# Patient Record
Sex: MALE | Race: WHITE
[De-identification: names, ages, dates, MRNs, and addresses within clinical notes are randomized per-mention and may not be internally consistent; named-entity substitution may affect disease eponyms.]

---

## 2019-04-18 ENCOUNTER — HOSPITAL ENCOUNTER (EMERGENCY)
Dept: HOSPITAL 80 - FED | Age: 49
Discharge: HOME | End: 2019-04-18
Payer: COMMERCIAL

## 2019-04-18 VITALS — DIASTOLIC BLOOD PRESSURE: 75 MMHG | SYSTOLIC BLOOD PRESSURE: 142 MMHG

## 2019-04-18 DIAGNOSIS — S05.92XA: ICD-10-CM
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
## 2019-04-18 DIAGNOSIS — S05.91XA: Primary | ICD-10-CM
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
## 2019-04-18 NOTE — EDPHY
H & P


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


Time Seen by Provider: 04/18/19 03:20


HPI/ROS: 





HPI





CHIEF COMPLAINT:  Welding, bilateral eye pain





HISTORY OF PRESENT ILLNESS:  Patient is a 48-year-old male, he states he is 

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 

majority sees out of his left eye.  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 

and he was not wearing appropriate eye protection was not well thing with his 

mask down.  He states tonight he developed some bilateral eye pain however were 

side of his left eye than right.  The pain got severe tonight and progressively 

worse throughout the night and had tearing.  He is unsure if he injured his 

eyes from welding without protection.  He denies any foreign body or anything 

getting into his eyes.  Bright lights do hurt.








Past Medical History:  Denies significant medical history





Past Surgical History:  Denies significant surgical history





Social History:  Denies drugs alcohol tobacco.





Family History:  Noncontributory








ROS   


REVIEW OF SYSTEMS:


10 Systems were reviewed and negative with the exception of the elements 

mentioned in the history of present illness.








Exam   


Constitutional   triage nursing summary reviewed, vital signs reviewed, awake/

alert. 


Eyes   pupils are equal round react to light, normal combination, extraocular 

movements intact, no proptosis, globes are soft, anterior chambers normal, 

right eye he is blind out of, left thigh complains of pain with blurry vision.  

The conjunctiva both eyes are injected.  Visual fields reviewed.  Limited out 

of the right eye, however able to see out of his left eye with normal visual 

fields.  Visual acuity reviewed


HENT   normal inspection, atraumatic, moist mucus membranes, no epistaxis, neck 

supple/ no meningismus, no raccoon eyes. 


Respiratory   clear to auscultation bilaterally, normal breath sounds, no 

respiratory distress, no wheezing. 


Cardiovascular   rate normal, regular rhythm, no murmur, no edema, distal 

pulses normal. 


Gastrointestinal   soft, non-tender, no rebound, no guarding, normal bowel 

sounds, no distension, no pulsatile mass. 


Genitourinary   no CVA tenderness. 


Musculoskeletal  no midline vertebral tenderness, full range of motion, no calf 

swelling, no tenderness of extremities, no meningismus, good pulses, 

neurovascularly intact.


Skin   pink, warm, & dry, no rash, skin atraumatic. 


Neurologic   awake, alert and oriented x 3, AAOx3, moves all 4 extremities 

equally, motor intact, sensory intact, CN II-XII intact, normal cerebellar, 

normal vision, normal speech. 


Psychiatric   normal mood/affect. 


Heme/Lymph/Immune   no lymphadenopathy.





Differential Diagnosis:  Includes but is not limited to in a particular order:  

Arc eye, welding eye, photosensitivity, iritis, uveitis, foreign body





Medical Decision Making:  Plan for this patient full eye exam.  Check visual 

acuity.  Will apply proparacaine to see if this gives him pain relief, will 

check for foreign bodies.





Re-evaluation:








Proparacaine was applied to both eyes and gave him great pain relief.  His pain 

is pretty much resolved after proparacaine applied.


Wood's lamp was applied with floor seen, I am unable to see scratch, or corneal 

abrasion or foreign bodies visualized.





I will consult Ophthalmology.





I spoke with Ophthalmology 344 a.m. They recommend prednisolone eyedrops.  And 

close follow-up this morning.  They be glad to see the patient in the office.  

I have discussed this at length with the patient.  He understands he needs to 

follow up closely with Ophthalmology.  He should see them this morning.  Call 

their 1st thing in the morning.





VA: left eye 20/30


Source: Patient





- Personal History


Current Tetanus/Diphtheria Vaccine: Unsure


Current Tetanus Diphtheria and Acellular Pertussis (TDAP): Unsure





- Medical/Surgical History


Hx Asthma: No


Hx Chronic Respiratory Disease: No


Hx Diabetes: No


Hx Cardiac Disease: No


Hx Renal Disease: No


Hx Cirrhosis: No


Hx Alcoholism: No


Hx HIV/AIDS: No


Hx Splenectomy or Spleen Trauma: No


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





- Social History


Smoking Status: Never smoked


Constitutional: 


 Initial Vital Signs











Temperature (C)  36.6 C   04/18/19 02:40


 


Heart Rate  88   04/18/19 02:40


 


Respiratory Rate  16   04/18/19 02:40


 


Blood Pressure  144/82 H  04/18/19 02:40


 


O2 Sat (%)  98   04/18/19 02:40








 











O2 Delivery Mode               Room Air














Allergies/Adverse Reactions: 


 





bee venom protein (honey bee) Allergy (Verified 04/18/19 02:44)


 








Home Medications: 














 Medication  Instructions  Recorded


 


NK [No Known Home Meds]  04/18/19














Medical Decision Making





- Data Points


Medications Given: 


 








Discontinued Medications





Fluorescein Sodium (Bioglo)  1 mg OP EDNOW ONE


   Stop: 04/18/19 02:55


   Last Admin: 04/18/19 02:59 Dose:  Not Given


Proparacaine HCl (Alcaine 0.5%)  1 drops OP EDNOW ONE


   Stop: 04/18/19 02:55


   Last Admin: 04/18/19 03:00 Dose:  Not Given








Departure





- Departure


Disposition: Home, Routine, Self-Care


Clinical Impression: 


Exposure to welding arc


Qualifiers:


 Encounter type: initial encounter Qualified Code(s): W89.0XXA - Exposure to 

welding light (arc), initial encounter





Condition: Good


Instructions:  Blurred Vision (ED), Eye Pain (ED)


Additional Instructions: 


1. Do not rub your eye.


2. Cool compresses


3. Recommend anti-inflammatory pain medicine like Tylenol and/or Motrin


4. You need to follow up with the eye doctor today.


Referrals: 


NONE *PRIMARY CARE P,. [Primary Care Provider] - As per Instructions


Adria Baca MD [Medical Doctor] - As per Instructions